# Patient Record
Sex: FEMALE | Race: WHITE
[De-identification: names, ages, dates, MRNs, and addresses within clinical notes are randomized per-mention and may not be internally consistent; named-entity substitution may affect disease eponyms.]

---

## 2017-07-03 ENCOUNTER — HOSPITAL ENCOUNTER (EMERGENCY)
Dept: HOSPITAL 17 - NEPD | Age: 22
Discharge: HOME | End: 2017-07-03
Payer: MEDICAID

## 2017-07-03 VITALS — BODY MASS INDEX: 21.67 KG/M2 | HEIGHT: 65 IN | WEIGHT: 130.07 LBS

## 2017-07-03 VITALS — SYSTOLIC BLOOD PRESSURE: 96 MMHG | HEART RATE: 75 BPM | DIASTOLIC BLOOD PRESSURE: 64 MMHG

## 2017-07-03 VITALS
SYSTOLIC BLOOD PRESSURE: 113 MMHG | RESPIRATION RATE: 15 BRPM | DIASTOLIC BLOOD PRESSURE: 59 MMHG | OXYGEN SATURATION: 100 % | HEART RATE: 87 BPM | TEMPERATURE: 98.1 F

## 2017-07-03 DIAGNOSIS — Z3A.00: ICD-10-CM

## 2017-07-03 DIAGNOSIS — O21.9: Primary | ICD-10-CM

## 2017-07-03 DIAGNOSIS — Z86.59: ICD-10-CM

## 2017-07-03 DIAGNOSIS — R10.30: ICD-10-CM

## 2017-07-03 LAB
BACTERIA #/AREA URNS HPF: (no result) /HPF
COLOR UR: YELLOW
COMMENT (UR): (no result)
CULTURE IF INDICATED: (no result)
GLUCOSE UR STRIP-MCNC: (no result) MG/DL
HGB UR QL STRIP: (no result)
KETONES UR STRIP-MCNC: (no result) MG/DL
MUCOUS THREADS #/AREA URNS LPF: (no result) /LPF
NITRITE UR QL STRIP: (no result)
SP GR UR STRIP: 1.01 (ref 1–1.03)
SQUAMOUS #/AREA URNS HPF: 26 /HPF (ref 0–5)

## 2017-07-03 PROCEDURE — 96374 THER/PROPH/DIAG INJ IV PUSH: CPT

## 2017-07-03 PROCEDURE — 84703 CHORIONIC GONADOTROPIN ASSAY: CPT

## 2017-07-03 PROCEDURE — 81001 URINALYSIS AUTO W/SCOPE: CPT

## 2017-07-03 PROCEDURE — 99284 EMERGENCY DEPT VISIT MOD MDM: CPT

## 2017-07-03 NOTE — PD
HPI


.


Nausea


Chief Complaint:  GI Complaint


Time Seen by Provider:  17:25


Travel History


International Travel<30 days:  No


Contact w/Intl Traveler<30days:  No


Traveled to known affect area:  No





History of Present Illness


HPI


22-year-old female presents with nausea and vomiting for 1-1/2 weeks.  Symptoms 

are getting progressively worse.  Patient took a pregnancy test on Friday which 

she reports was positive.  She had 4 episodes of emesis yesterday.  She has 

tried eating saltine crackers with no relief. No other known aggravating or 

alleviating factors.  She is having some associated lower abdominal pain that 

is bilateral, no radiation.  Describes the pain as sharp and constant.  She 

denies any vaginal bleeding.  Reports some clear discharge.





PFSH


Past Medical History


ADHD:  No


Bipolar Disorder:  Yes


Cancer:  No


Cardiovascular Problems:  No


Diabetes:  No (gestational)


Diminished Hearing:  No


Psychiatric:  Yes (HBS 2 YEARS AGO. CHANCE W DYSTHYMIC DIS.)


Immunizations Current:  Yes


Seizures:  No


Thyroid Disease:  No


Ulcer:  No


Tetanus Vaccination:  < 5 Years


Pregnant?:  Pregnant


LMP:  2017


:  3


Para:  1


:  1





Past Surgical History


Other Surgery:  No





Social History


Alcohol Use:  No


Tobacco Use:  No


Substance Use:  Yes (marijuana)





Allergies-Medications


(Allergen,Severity, Reaction):  


Coded Allergies:  


     Calamine (Verified  Allergy, Severe, SWELLING, 7/3/17)


Reported Meds & Prescriptions





Reported Meds & Active Scripts


Active


Zofran (Ondansetron HCl) 4 Mg Tab 4 Mg PO Q6HR PRN








Review of Systems


Except as stated in HPI:  all other systems reviewed are Neg


General / Constitutional:  Positive: Other (shaking)


Gastrointestinal:  Positive: Nausea, Vomiting, Diarrhea, Abdominal Pain


Genitourinary:  Positive: Pelvic Pain,  No: Urgency, Frequency, Dysuria, 

Vaginal Bleeding





Physical Exam


Narrative


GENERAL: Awake and alert and in no acute distress.


SKIN: Warm and dry.


HEAD: Atraumatic. Normocephalic. 


EYES: Pupils equal and round. 


NECK: Trachea midline.  


CARDIOVASCULAR: Regular rate and rhythm.  


RESPIRATORY: No accessory muscle use. 


GI/:  Abdomen is soft and nontender.  Nondistended.  No masses palpated.  

Uterus is not palpable above the symphysis pubis.


MUSCULOSKELETAL: No obvious deformities.   No edema. 


NEUROLOGICAL: Awake and alert. No obvious cranial nerve deficits.  Motor 

grossly within normal limits. Normal speech.


PSYCHIATRIC: Appropriate mood and affect; insight and judgment normal.





Data


Data


Last Documented VS





Vital Signs








  Date Time  Temp Pulse Resp B/P Pulse Ox O2 Delivery O2 Flow Rate FiO2


 


7/3/17 18:06  75  96/64    


 


7/3/17 17:04 98.1  15  100   








Orders





 Ed Poc Ultrasound (7/3/17 17:32)


Urinalysis - C+S If Indicated (7/3/17 17:41)


Sodium Chloride 0.9% Flush (Ns Flush) (7/3/17 17:45)


Ondansetron Inj (Zofran Inj) (7/3/17 17:45)


Ed Urine Pregnancytest Poc (7/3/17 17:41)


Sodium Chlor 0.9% 1000 Ml Inj (Ns 1000 M (7/3/17 17:45)





Labs








 Laboratory Tests








Test 7/3/17





 18:05


 


Urine Color YELLOW 


 


Urine Turbidity HAZY 


 


Urine pH 6.5 


 


Urine Specific Gravity 1.015 


 


Urine Protein 30 mg/dL


 


Urine Glucose (UA) NEG mg/dL


 


Urine Ketones TRACE mg/dL


 


Urine Occult Blood NEG 


 


Urine Nitrite NEG 


 


Urine Bilirubin NEG 


 


Urine Urobilinogen LESS THAN 2.0





 MG/DL


 


Urine Leukocyte Esterase NEG 


 


Urine RBC 1 /hpf


 


Urine WBC 2 /hpf


 


Urine Squamous Epithelial 26 /hpf





Cells 


 


Urine Bacteria RARE /hpf


 


Urine Mucus MOD /lpf


 


Microscopic Urinalysis Comment CULT NOT





 INDICATED














MDM


Medical Decision Making


Medical Screen Exam Complete:  Yes


Emergency Medical Condition:  Yes


Differential Diagnosis


Differential diagnosis includes but is not limited to viral gastritis, food 

poisoning, pancreatitis, pneumonia, hepatitis, acute coronary syndrome, 

pregnancy


Narrative Course


Patient presents with the chief complaint of nausea and vomiting in the setting 

of pregnancy.  She does have lower abdominal discomfort but a benign abdominal 

exam.  She does not appear dehydrated.  She'll be treated with IV fluids and IV 

Zofran.





Nausea and vomiting are improved.  Patient will be discharged home.





Procedures


**Procedure Narrative**


Emergency Department Pelvic ultrasound was performed with patient consent.


The curvilinear probe was used in the transverse and sagittal views within the 

suprapubic region revealing an intrauterine yolk sac.





Diagnosis





 Primary Impression:  


 Vomiting affecting pregnancy


Referrals:  


Obstetrician


Patient Instructions:  Acute Nausea and Vomiting (DC), General Instructions


***Med/Other Pt SpecificInfo:  Prescription(s) given


Scripts


Ondansetron (Zofran)4 Mg Tab4 Mg PO Q6HR PRN (NAUSEA OR VOMITING) #30 TAB  Ref 0


   Prov:Brandi Doll MD         7/3/17


Disposition:  01 DISCHARGE HOME


Condition:  Stable








Brandi Doll MD Jul 3, 2017 18:10

## 2017-10-26 ENCOUNTER — HOSPITAL ENCOUNTER (EMERGENCY)
Dept: HOSPITAL 17 - HOBED | Age: 22
Discharge: HOME | End: 2017-10-26
Payer: MEDICAID

## 2017-10-26 DIAGNOSIS — O26.892: Primary | ICD-10-CM

## 2017-10-26 DIAGNOSIS — Z3A.23: ICD-10-CM

## 2017-10-26 DIAGNOSIS — R10.9: ICD-10-CM

## 2017-10-26 DIAGNOSIS — R07.81: ICD-10-CM

## 2017-10-26 DIAGNOSIS — G58.8: ICD-10-CM

## 2017-10-26 PROCEDURE — 99283 EMERGENCY DEPT VISIT LOW MDM: CPT

## 2017-10-26 NOTE — PD
HPI


Chief Complaint


rib pain, lower abdominal/back pain


Date Seen:  Oct 26, 2017


Time Seen:  16:14


Travel History


International Travel<30 Days:  No


Contact w/Intl Traveler<30Days:  No


Known Affected Area:  No





History of Present Illness


HPI


Pt is a  @ 23.1wks with PNC with Dr. Davies.  She presents for 

evaluation of rib and abdominal/back pain which started last evening and 

continued today.  Pt states it feels like she can't take in a deep breath due 

to discomfort in her lower ribs.  No cough, SOB, or CP/pressure.  Her son 

recently had RSV at home but she has not been ill.  Her abdominal pain is vague 

and diffuse and runs into her lower L sciatica.  She denies dysuria/hematuria.  

She was tx'd for chlamydia and BV on .  No MILKA yet.  +FM.  No LOF, VB, or 

ctx.  She has not taking any tylenol as she does not want to take any 

medications in pregnancy.


Weeks Gestation:  23


Para:  1


:  3





History


Past Medical History


Medical History:  Denies Significant Hx





Obstetric History


Obstetric History


 x1


TAB x1 (D&C)





Past Surgical History


*** Narrative Surgical


D&C





Family History


Family History:  Negative





Social History


Alcohol Use:  No


Tobacco Use:  No


Substance Abuse:  Yes (occasional MJ use)





Allergies-Medications


(Allergen,Severity, Reaction):  


Coded Allergies:  


     calamine (Unverified  Allergy, Severe, SWELLING, 8/15/17)


Home Meds


Active Scripts


Ondansetron (Zofran) 4 Mg Tab, 4 MG PO Q6HR Y for NAUSEA OR VOMITING, #30 TAB 0 

Refills


   Prov:Brandi Doll MD         7/3/17





Review of Systems


Except as stated in HPI:  all other systems reviewed are Neg





Physical Exam


Narrative


GENERAL: Well-nourished, well-developed patient.


SKIN: Warm and dry.


HEAD: Normocephalic and atraumatic.


EYES: No scleral icterus. No injection or drainage. 


CARDIOVASCULAR: Regular rate and rhythm without murmurs, gallops, or rubs. 


RESPIRATORY: Breath sounds equal bilaterally. No accessory muscle use.


ABDOMEN/GI: Abdomen soft, non-tender, fundus just above umbilicus and non-tender

, +suprapubic TTP, no rebound or guarding


EXTREMITIES: No cyanosis or edema.


BACK: Nontender without obvious deformity. No CVA tenderness.


NEUROLOGICAL: Awake and alert. Motor and sensory grossly within normal limits. 





FHTs:  present @ 168


TOCO:  no ctx seen





Data


Data


Vital Signs Reviewed:  Yes





MDM


Plan


21y/o  @ 23.1wks with





1.  IUP


-- +FHTs





2.  abdominal pain


-- suprapubic in location


-- recent dx of CT/BV with tx but no MILKA


-- no rebound/guarding


-- UA demonstrates trace blood, no LE/nitrites


-- likely RLP





3.  back pain


-- no CVA tenderness


-- consistent with sciatica





4.  rib pain


-- lungs clear and O2 sats 100%


-- likely musculoskeletal


-- declines tylenol or flexeril





Dispo:  d/c home in stable condition


Diagnosis


Diagnosis:  


 Primary Impression:  


 Pregnancy


 Additional Impressions:  


 23 weeks gestation of pregnancy


 Abdominal pain affecting pregnancy


 Rib pain


 Sciatic neuralgia


Disposition:  01 DISCHARGE HOME











Hien Moss MD Oct 26, 2017 16:27

## 2017-10-26 NOTE — PD
HPI


Chief Complaint


rib pain, lower abdominal/back pain


Date Seen:  Oct 26, 2017


Time Seen:  16:14


Travel History


International Travel<30 Days:  No


Contact w/Intl Traveler<30Days:  No


Known Affected Area:  No





History of Present Illness


HPI


Pt is a  @ 23.1wks with PNC with Dr. Davies.  She presents for 

evaluation of rib and abdominal/back pain which started last evening and 

continued today.  Pt states it feels like she can't take in a deep breath due 

to discomfort in her lower ribs.  No cough, SOB, or CP/pressure.  Her son 

recently had RSV at home but she has not been ill.  Her abdominal pain is vague 

and diffuse and runs into her lower L sciatica.  She denies dysuria/hematuria.  

She was tx'd for chlamydia and BV on .  No MILKA yet.  +FM.  No LOF, VB, or 

ctx.  She has not taking any tylenol as she does not want to take any 

medications in pregnancy.


Weeks Gestation:  23


Para:  1


:  3





History


Past Medical History


Medical History:  Denies Significant Hx





Obstetric History


Obstetric History


 x1


TAB x1 (D&C)





Past Surgical History


*** Narrative Surgical


D&C





Family History


Family History:  Negative





Social History


Alcohol Use:  No


Tobacco Use:  No


Substance Abuse:  Yes (occasional MJ use)





Allergies-Medications


(Allergen,Severity, Reaction):  


Coded Allergies:  


     calamine (Unverified  Allergy, Severe, SWELLING, 8/15/17)


Home Meds


Active Scripts


Ondansetron (Zofran) 4 Mg Tab, 4 MG PO Q6HR Y for NAUSEA OR VOMITING, #30 TAB 0 

Refills


   Prov:Brandi Doll MD         7/3/17





Review of Systems


Except as stated in HPI:  all other systems reviewed are Neg





Physical Exam


Narrative


GENERAL: Well-nourished, well-developed patient.


SKIN: Warm and dry.


HEAD: Normocephalic and atraumatic.


EYES: No scleral icterus. No injection or drainage. 


CARDIOVASCULAR: Regular rate and rhythm without murmurs, gallops, or rubs. 


RESPIRATORY: Breath sounds equal bilaterally. No accessory muscle use.


ABDOMEN/GI: Abdomen soft, non-tender, fundus just above umbilicus and non-tender

, +suprapubic TTP, no rebound or guarding


EXTREMITIES: No cyanosis or edema.


BACK: Nontender without obvious deformity. No CVA tenderness.


NEUROLOGICAL: Awake and alert. Motor and sensory grossly within normal limits. 





FHTs:  present @ 168


TOCO:  no ctx seen





Data


Data


Vital Signs Reviewed:  Yes





MDM


Plan


23y/o  @ 23.1wks with





1.  IUP


-- +FHTs





2.  abdominal pain


-- suprapubic in location


-- recent dx of CT/BV with tx but no MILKA


-- no rebound/guarding


-- UA demonstrates trace blood, no LE/nitrites


-- likely RLP





3.  back pain


-- no CVA tenderness


-- consistent with sciatica





4.  rib pain


-- lungs clear and O2 sats 100%


-- likely musculoskeletal


-- declines tylenol or flexeril





Dispo:  d/c home in stable condition


Diagnosis


Diagnosis:  


 Primary Impression:  


 Pregnancy


 Additional Impressions:  


 23 weeks gestation of pregnancy


 Abdominal pain affecting pregnancy


 Rib pain


 Sciatic neuralgia


Disposition:  01 DISCHARGE HOME











Hien Moss MD Oct 26, 2017 16:27

## 2018-01-16 ENCOUNTER — HOSPITAL ENCOUNTER (OUTPATIENT)
Dept: HOSPITAL 17 - HPND | Age: 23
End: 2018-01-16
Attending: OBSTETRICS & GYNECOLOGY
Payer: MEDICAID

## 2018-01-16 DIAGNOSIS — O26.843: Primary | ICD-10-CM

## 2018-01-16 DIAGNOSIS — O36.5930: ICD-10-CM

## 2018-01-16 PROCEDURE — 76816 OB US FOLLOW-UP PER FETUS: CPT

## 2018-02-07 ENCOUNTER — HOSPITAL ENCOUNTER (EMERGENCY)
Dept: HOSPITAL 17 - HOBED | Age: 23
Discharge: HOME | End: 2018-02-07
Payer: MEDICAID

## 2018-02-07 DIAGNOSIS — Z3A.38: ICD-10-CM

## 2018-02-07 DIAGNOSIS — Z79.899: ICD-10-CM

## 2018-02-07 DIAGNOSIS — O47.1: Primary | ICD-10-CM

## 2018-02-07 PROCEDURE — 99284 EMERGENCY DEPT VISIT MOD MDM: CPT

## 2018-02-07 NOTE — PD
HPI


Chief Complaint


 Complains of a pressure possible contractions


Date Seen:  2018


Time Seen:  21:25


Travel History


International Travel<30 Days:  No


Contact w/Intl Traveler<30Days:  No


Known Affected Area:  No





History of Present Illness


HPI


42-year-old white female  A1 at 38 weeks sees Dr. Davies for prenatal 

care presents complaining of pelvic pain and pressure possible contractions.  

Denies bleeding or leakage of fluid.  Fetal heart rate tracing is reactive and 

there are no regular contractions only occasional one


Weeks Gestation:  38


Para:  1


:  3





History


Obstetric History


Obstetric History


One vaginal delivery and one early pregnancy loss





Social History


Alcohol Use:  No


Tobacco Use:  No


Substance Abuse:  No





Allergies-Medications


(Allergen,Severity, Reaction):  


Coded Allergies:  


     calamine (Unverified  Allergy, Severe, SWELLING, 8/15/17)


Home Meds


Active Scripts


Ondansetron (Zofran) 4 Mg Tab, 4 MG PO Q6HR Y for NAUSEA OR VOMITING, #30 TAB 0 

Refills


   Prov:Brandi Doll MD         7/3/17





Review of Systems


General / Constitutional:  No: Fever, Weight Gain, Chills, Other


Eyes:  No: Diploplia, Blurred Vision, Visual changes, Pain, Photophobia


HENT:  No: Headaches, Vertigo, Lightheadedness


Cardiovascular:  No: Irregular Rhythm, Chest Pain or Discomfort, Palpitations, 

Tachycardia, Syncope, Varicosities, Edema, Cyanosis


Respiratory:  No: Cough, Short of Breath, Other


Gastrointestinal:  Abdominal Pain, No: Nausea, Vomiting, Diarrhea


Genitourinary:  No: Decreased Urinary Output, Oliguria


Musculoskeletal:  No: Limited ROM, Weakness, Cramping, Edema, Pain


Skin:  No Rash, No Itching, No Dryness, No Lumps, No Change in Pigmentation, No 

Change in Nails, No Alopecia, No Lesions


Neurologic:  No: Weakness, Dizziness, Syncope, Focal Abnormalities, 

Coordination Problem, Headache, Slurred Speech, Seizures


Psychiatric:  No: Depression, Suicidal Ideations, Homicidal Ideation


Endocrine:  No: Heat Intolerance, Cold Intolerance, Polydipsia, Polyuria, Other





Physical Exam


Narrative


GENERAL: Well-nourished, well-developed patient.


SKIN: Warm and dry.


HEAD: Normocephalic and atraumatic.


EYES: No scleral icterus. No injection or drainage. 


ENT: No nasal drainage noted. Mucous membranes pink. Airway patent.


NECK: Supple, trachea midline. No JVD.


CARDIOVASCULAR: Regular rate and rhythm without murmurs, gallops, or rubs. 


RESPIRATORY: Breath sounds equal bilaterally. No accessory muscle use.


BREASTS: Bilateral exam showed no masses , no retractions, no nipple discharge.


ABDOMEN/GI: Abdomen soft, non-tender, bowel sounds present, no rebound, no 

guarding 


   Gravid to [38-] weeks size


   Fundal Height: [38-]


GENITOURINARY: 


   External Genitalia: intact and normal in appearance


   BUS glands: [-]


   Cervix: [post-]


   Dilatation: [1-2-]          


   Effacement: [-70]          


   Station: [-3]  


   Presentation: [-vtx]        


   Membranes: [intact  ]


   Uterine Contractions: [no reg ctx-]


FHT's: 


   Category: [1-]   


   Baseline: [-133]   


   Reactive: [-R]   


   Variability: [mod-]  


   Decels: [none-]  


EXTREMITIES: No cyanosis or edema.


BACK: Nontender without obvious deformity. No CVA tenderness.


NEUROLOGICAL: Awake and alert. Motor and sensory grossly within normal limits. 

Five out of 5 muscle strength in all muscle groups. Normal speech.





MDM


Interpretation(s)


Patient's 22-year-old white female  at 38 weeks presents quite a pelvic 

pressure and possible contractions.  She's placed on the monitor and has a 

reactive fetal heart rate tracing.  Only occasional contractions.  Cervix is 1-2

/70/-3/vertex.


Plan


Plan to discharge patient home to bedrest.  The patient drove herself with a 

toddler and she has no other her way to get home and so we can I give her 

narcotic shot however she can take Tylenol, bedrest, oral fluids hydration, 

heating pad or hot bath for symptom relief and see Dr. Davies


Diagnosis


Diagnosis:  


 Primary Impression:  


 [place]' contraction


Disposition:  01 DISCHARGE HOME


Condition:  Stable











Cecilio Gonzalez II, MD 2018 21:31

## 2018-02-16 ENCOUNTER — HOSPITAL ENCOUNTER (INPATIENT)
Dept: HOSPITAL 17 - HOBED | Age: 23
LOS: 1 days | Discharge: HOME | End: 2018-02-17
Attending: OBSTETRICS & GYNECOLOGY | Admitting: OBSTETRICS & GYNECOLOGY
Payer: MEDICAID

## 2018-02-16 VITALS — HEART RATE: 95 BPM | SYSTOLIC BLOOD PRESSURE: 112 MMHG | DIASTOLIC BLOOD PRESSURE: 64 MMHG

## 2018-02-16 VITALS — DIASTOLIC BLOOD PRESSURE: 60 MMHG | SYSTOLIC BLOOD PRESSURE: 117 MMHG | HEART RATE: 84 BPM

## 2018-02-16 VITALS — SYSTOLIC BLOOD PRESSURE: 105 MMHG | DIASTOLIC BLOOD PRESSURE: 63 MMHG | HEART RATE: 74 BPM

## 2018-02-16 VITALS — SYSTOLIC BLOOD PRESSURE: 99 MMHG | HEART RATE: 84 BPM | DIASTOLIC BLOOD PRESSURE: 58 MMHG

## 2018-02-16 VITALS — SYSTOLIC BLOOD PRESSURE: 85 MMHG | HEART RATE: 90 BPM | DIASTOLIC BLOOD PRESSURE: 56 MMHG

## 2018-02-16 VITALS — SYSTOLIC BLOOD PRESSURE: 101 MMHG | DIASTOLIC BLOOD PRESSURE: 66 MMHG | HEART RATE: 88 BPM

## 2018-02-16 VITALS — DIASTOLIC BLOOD PRESSURE: 60 MMHG | HEART RATE: 95 BPM | SYSTOLIC BLOOD PRESSURE: 115 MMHG

## 2018-02-16 VITALS
DIASTOLIC BLOOD PRESSURE: 55 MMHG | HEART RATE: 76 BPM | TEMPERATURE: 97.7 F | SYSTOLIC BLOOD PRESSURE: 106 MMHG | RESPIRATION RATE: 18 BRPM

## 2018-02-16 VITALS — DIASTOLIC BLOOD PRESSURE: 75 MMHG | SYSTOLIC BLOOD PRESSURE: 95 MMHG | HEART RATE: 94 BPM

## 2018-02-16 VITALS — SYSTOLIC BLOOD PRESSURE: 107 MMHG | DIASTOLIC BLOOD PRESSURE: 63 MMHG | HEART RATE: 91 BPM

## 2018-02-16 VITALS — DIASTOLIC BLOOD PRESSURE: 66 MMHG | HEART RATE: 87 BPM | SYSTOLIC BLOOD PRESSURE: 127 MMHG

## 2018-02-16 VITALS — TEMPERATURE: 97.7 F | RESPIRATION RATE: 20 BRPM

## 2018-02-16 VITALS
HEART RATE: 102 BPM | TEMPERATURE: 97.8 F | SYSTOLIC BLOOD PRESSURE: 114 MMHG | RESPIRATION RATE: 18 BRPM | DIASTOLIC BLOOD PRESSURE: 73 MMHG

## 2018-02-16 VITALS — HEART RATE: 80 BPM | SYSTOLIC BLOOD PRESSURE: 104 MMHG | DIASTOLIC BLOOD PRESSURE: 63 MMHG

## 2018-02-16 VITALS — TEMPERATURE: 97.6 F | RESPIRATION RATE: 18 BRPM

## 2018-02-16 VITALS — SYSTOLIC BLOOD PRESSURE: 112 MMHG | DIASTOLIC BLOOD PRESSURE: 68 MMHG | HEART RATE: 89 BPM

## 2018-02-16 VITALS — RESPIRATION RATE: 18 BRPM

## 2018-02-16 VITALS — SYSTOLIC BLOOD PRESSURE: 118 MMHG | DIASTOLIC BLOOD PRESSURE: 72 MMHG | HEART RATE: 108 BPM

## 2018-02-16 VITALS — OXYGEN SATURATION: 100 % | HEART RATE: 94 BPM

## 2018-02-16 VITALS — HEART RATE: 85 BPM | SYSTOLIC BLOOD PRESSURE: 102 MMHG | DIASTOLIC BLOOD PRESSURE: 68 MMHG

## 2018-02-16 VITALS — DIASTOLIC BLOOD PRESSURE: 69 MMHG | SYSTOLIC BLOOD PRESSURE: 102 MMHG | HEART RATE: 88 BPM

## 2018-02-16 VITALS — DIASTOLIC BLOOD PRESSURE: 55 MMHG | SYSTOLIC BLOOD PRESSURE: 99 MMHG | OXYGEN SATURATION: 100 % | HEART RATE: 95 BPM

## 2018-02-16 VITALS — SYSTOLIC BLOOD PRESSURE: 96 MMHG | HEART RATE: 89 BPM | DIASTOLIC BLOOD PRESSURE: 55 MMHG

## 2018-02-16 VITALS — HEART RATE: 93 BPM | RESPIRATION RATE: 18 BRPM | DIASTOLIC BLOOD PRESSURE: 63 MMHG | SYSTOLIC BLOOD PRESSURE: 107 MMHG

## 2018-02-16 VITALS — DIASTOLIC BLOOD PRESSURE: 52 MMHG | HEART RATE: 88 BPM | SYSTOLIC BLOOD PRESSURE: 95 MMHG

## 2018-02-16 VITALS — DIASTOLIC BLOOD PRESSURE: 74 MMHG | HEART RATE: 92 BPM | SYSTOLIC BLOOD PRESSURE: 113 MMHG

## 2018-02-16 VITALS — DIASTOLIC BLOOD PRESSURE: 57 MMHG | HEART RATE: 85 BPM | SYSTOLIC BLOOD PRESSURE: 99 MMHG

## 2018-02-16 VITALS — SYSTOLIC BLOOD PRESSURE: 107 MMHG | DIASTOLIC BLOOD PRESSURE: 67 MMHG | HEART RATE: 93 BPM

## 2018-02-16 VITALS — HEART RATE: 108 BPM | SYSTOLIC BLOOD PRESSURE: 129 MMHG | DIASTOLIC BLOOD PRESSURE: 61 MMHG

## 2018-02-16 VITALS — OXYGEN SATURATION: 100 % | DIASTOLIC BLOOD PRESSURE: 59 MMHG | SYSTOLIC BLOOD PRESSURE: 104 MMHG | HEART RATE: 82 BPM

## 2018-02-16 VITALS — WEIGHT: 155 LBS | HEIGHT: 63 IN | BODY MASS INDEX: 27.46 KG/M2

## 2018-02-16 VITALS — SYSTOLIC BLOOD PRESSURE: 106 MMHG | DIASTOLIC BLOOD PRESSURE: 74 MMHG | HEART RATE: 102 BPM

## 2018-02-16 VITALS — HEART RATE: 87 BPM | DIASTOLIC BLOOD PRESSURE: 61 MMHG | SYSTOLIC BLOOD PRESSURE: 111 MMHG

## 2018-02-16 VITALS — OXYGEN SATURATION: 100 % | HEART RATE: 88 BPM

## 2018-02-16 DIAGNOSIS — F12.10: ICD-10-CM

## 2018-02-16 DIAGNOSIS — Z3A.39: ICD-10-CM

## 2018-02-16 LAB
BACTERIA #/AREA URNS HPF: (no result) /HPF
BASOPHILS # BLD AUTO: 0.1 TH/MM3 (ref 0–0.2)
BASOPHILS NFR BLD: 0.6 % (ref 0–2)
COLOR UR: (no result)
EOSINOPHIL # BLD: 0.2 TH/MM3 (ref 0–0.4)
EOSINOPHIL NFR BLD: 1.3 % (ref 0–4)
ERYTHROCYTE [DISTWIDTH] IN BLOOD BY AUTOMATED COUNT: 19.7 % (ref 11.6–17.2)
GLUCOSE UR STRIP-MCNC: (no result) MG/DL
HCT VFR BLD CALC: 30.9 % (ref 35–46)
HGB BLD-MCNC: 9.9 GM/DL (ref 11.6–15.3)
HGB UR QL STRIP: (no result)
HYALINE CASTS #/AREA URNS LPF: 1 /LPF
KETONES UR STRIP-MCNC: (no result) MG/DL
LYMPHOCYTES # BLD AUTO: 3.6 TH/MM3 (ref 1–4.8)
LYMPHOCYTES NFR BLD AUTO: 29.1 % (ref 9–44)
MCH RBC QN AUTO: 25.7 PG (ref 27–34)
MCHC RBC AUTO-ENTMCNC: 32.2 % (ref 32–36)
MCV RBC AUTO: 79.7 FL (ref 80–100)
MONOCYTE #: 1 TH/MM3 (ref 0–0.9)
MONOCYTES NFR BLD: 8.5 % (ref 0–8)
MUCOUS THREADS #/AREA URNS LPF: (no result) /LPF
NEUTROPHILS # BLD AUTO: 7.4 TH/MM3 (ref 1.8–7.7)
NEUTROPHILS NFR BLD AUTO: 60.5 % (ref 16–70)
NITRITE UR QL STRIP: (no result)
PLATELET # BLD: 447 TH/MM3 (ref 150–450)
PMV BLD AUTO: 7.1 FL (ref 7–11)
RBC # BLD AUTO: 3.87 MIL/MM3 (ref 4–5.3)
SP GR UR STRIP: 1 (ref 1–1.03)
SQUAMOUS #/AREA URNS HPF: 22 /HPF (ref 0–5)
URINE LEUKOCYTE ESTERASE: (no result)
WBC # BLD AUTO: 12.2 TH/MM3 (ref 4–11)

## 2018-02-16 PROCEDURE — 0HQ9XZZ REPAIR PERINEUM SKIN, EXTERNAL APPROACH: ICD-10-PCS | Performed by: OBSTETRICS & GYNECOLOGY

## 2018-02-16 PROCEDURE — 85025 COMPLETE CBC W/AUTO DIFF WBC: CPT

## 2018-02-16 PROCEDURE — 86900 BLOOD TYPING SEROLOGIC ABO: CPT

## 2018-02-16 PROCEDURE — 76818 FETAL BIOPHYS PROFILE W/NST: CPT

## 2018-02-16 PROCEDURE — 80307 DRUG TEST PRSMV CHEM ANLYZR: CPT

## 2018-02-16 PROCEDURE — 10907ZC DRAINAGE OF AMNIOTIC FLUID, THERAPEUTIC FROM PRODUCTS OF CONCEPTION, VIA NATURAL OR ARTIFICIAL OPENING: ICD-10-PCS | Performed by: OBSTETRICS & GYNECOLOGY

## 2018-02-16 PROCEDURE — 81001 URINALYSIS AUTO W/SCOPE: CPT

## 2018-02-16 PROCEDURE — G0481 DRUG TEST DEF 8-14 CLASSES: HCPCS

## 2018-02-16 PROCEDURE — 86901 BLOOD TYPING SEROLOGIC RH(D): CPT

## 2018-02-16 RX ADMIN — OXYTOCIN SCH MLS/HR: 10 INJECTION, SOLUTION INTRAMUSCULAR; INTRAVENOUS at 03:35

## 2018-02-16 RX ADMIN — OXYTOCIN SCH MLS/HR: 10 INJECTION, SOLUTION INTRAMUSCULAR; INTRAVENOUS at 05:36

## 2018-02-16 RX ADMIN — STANDARDIZED SENNA CONCENTRATE AND DOCUSATE SODIUM PRN TAB: 8.6; 5 TABLET, FILM COATED ORAL at 21:50

## 2018-02-16 RX ADMIN — IBUPROFEN PRN MG: 800 TABLET, FILM COATED ORAL at 21:51

## 2018-02-16 RX ADMIN — IBUPROFEN PRN MG: 800 TABLET, FILM COATED ORAL at 13:18

## 2018-02-16 NOTE — PD.OB.DELI
Weeks gestation:  39


Gest age assessed date:  2018


Pt started active labor?:  Yes


Active labor start date:  2018


Medical induction of labor?:  No


Artificial rupture of membrane:  Yes


Artificial ROM date:  2018


Anesthesia:  Epidural


Episiotomy:  None


Vaginal Delivery:  Normal


Presentation:  Occiput anterior


Nuchal Cord:  None


Delayed cord clamping (45 sec):  Yes


Infant:  Female


Delivery date:  2018


Delivery time:  10:36


One Minute APGAR:  8


Five Minute APGAR:  9


Birth Weight:  6/13


Placenta:  Spontaneous delivery, Intact, 3 vessel cord


Laceration:  1 deg


Repair:  Vicryl running


Estimated blood loss:  300cc


Additional Information


Nice delivery of Sima.


Intact perineum


Small superficial left labial laceration repaired with 5-0 vicryl


Mom and baby did well


Sima looks like her older brother  Pj..











JOSEPH Davies MD 2018 11:11

## 2018-02-16 NOTE — PD
HPI


Chief Complaint


ctx


Date Seen:  2018


Time Seen:  03:24


Travel History


International Travel<30 Days:  No


Contact w/Intl Traveler<30Days:  No


Known Affected Area:  No





History of Present Illness


HPI


21y/o  @ 39.2wks.  She has PNC with Dr. Davies.  She presents for ctx 

which started at 2am and are becoming increasingly more painful.  No LOF or VB.

  +FM.  She was 2cm in clinic on Tuesday.


Weeks Gestation:  39


Para:  1


:  3





History


Past Medical History


Medical History:  Denies Significant Hx





Obstetric History


Obstetric History


TAB x1


 x1





Past Surgical History


Surgical History:  No Previous Surgery





Family History


Family History:  Negative





Social History


Alcohol Use:  No


Tobacco Use:  No


Substance Abuse:  Yes (MJ use entire pregnancy)





Allergies-Medications


(Allergen,Severity, Reaction):  


Coded Allergies:  


     calamine (Unverified  Allergy, Severe, SWELLING, 18)


Home Meds


Active Scripts


Ondansetron (Zofran) 4 Mg Tab, 4 MG PO Q6HR Y for NAUSEA OR VOMITING, #30 TAB 0 

Refills


   Prov:Brandi Doll MD         7/3/17





Review of Systems


Except as stated in HPI:  all other systems reviewed are Neg





Physical Exam


Narrative


General:  well developed, well nourished, no acute distress


HEENT:  normocephalic atraumatic, extraocular movements intact, neck supple 


Abdomen:  soft, gravid, nontender, nondistended


Uterus:  fundus term


Extremities:  full range of motion 


Skin:  normal coloration, no rashes, no suspicious skin lesions noted


Neurologic:  cranial nerves 2-12 grossly intact, normal muscle tone, normal gait


Psychiatric:  normal mood and affect, appropriate





FHTs:  130s, +accels, no decels, moderate variability, reactive


[place]:  ctx q2-3m


Cvx:  3/80/-1








Data


Data


Vital Signs Reviewed:  Yes


Orders





 Orders


Vital Signs (Adult) .ON ADMISSION (18 03:23)


^ Labor Status (18 03:23)


^ Non Stress Test (18 03:23)


Admit To Inpatient (18 )


Vital Signs (Adult) .Per protocol (18 03:23)


Fetal Heart (18 03:23)


Amnioinfusion (18 03:23)


Urinary Catheter Management .ONCE (18 03:23)


Diet Liquid (18 Breakfast)


Lactated Ringer's 1000 Ml Inj (Lr 1000 M (18 03:23)


Lactated Ringer's 1000 Ml Inj (Lr 1000 M (18 03:23)


Sodium Chlorid 0.9% 500 Ml Inj (Ns 500 M (18 03:30)


Sodium Chlor 0.9% 1000 Ml Inj (Ns 1000 M (18 03:43)


Lidocaine 1% Inj (50 Ml) (Xylocaine 1% I (18 03:30)


Citric Acid-Sodium Citrate Liq (Bicitra (18 03:30)


Ondansetron Inj (Zofran Inj) (18 03:30)


Fentanyl Inj (Fentanyl Inj) (18 03:30)


Fentanyl Inj (Fentanyl Inj) (18 03:30)


Complete Blood Count With Diff (18 03:23)


Hold Clot (18 03:23)


Abo/Rh Blood Type (18 03:23)


Urinalysis - C+S If Indicated (18 03:23)


Drug Screen, Random Urine (18 03:23)


Resp Oxygen Non Rebreathe Mask (18 )


^ Epidural / Intrathecal Infus (18 03:23)


Oxytocin 30 Units-500ml Premix (Pitocin (18 03:30)


Lidocaine 1% Inj (50 Ml) (Xylocaine 1% I (18 03:30)


Light Mineral Oil (Muri-Lube Oil) (18 03:30)


Inpatient Certification (18 )


Group B Strep:  Negative





MDM


Plan


21y/o  @ 39.2wks in early labor.





-- admit to L&D


-- CLD, epidural/davison PRN


-- FHTs cat 1


-- GBS neg





Dispo:  Dr. Davies (on call) notified of pt status and plan of care.  He will 

assume care of the pt.  Courtesy orders placed.


Diagnosis


Diagnosis:  


 Primary Impression:  


 39 weeks gestation of pregnancy


 Additional Impressions:  


 Uterine contractions during pregnancy


 Cannabis abuse











Hien Moss MD 2018 03:29

## 2018-02-17 VITALS
SYSTOLIC BLOOD PRESSURE: 101 MMHG | RESPIRATION RATE: 15 BRPM | TEMPERATURE: 97.6 F | HEART RATE: 78 BPM | DIASTOLIC BLOOD PRESSURE: 58 MMHG | OXYGEN SATURATION: 97 %

## 2018-02-17 RX ADMIN — STANDARDIZED SENNA CONCENTRATE AND DOCUSATE SODIUM PRN TAB: 8.6; 5 TABLET, FILM COATED ORAL at 12:27

## 2018-02-17 RX ADMIN — IBUPROFEN PRN MG: 800 TABLET, FILM COATED ORAL at 08:21

## 2018-02-17 NOTE — HHI.DCPOC
Discharge Care Plan


Diagnosis:  


(1) Vaginal delivery


Report Symptoms to Your Doctor


-Temperature above 100.5 degrees


-Redness, of incision or excessive or foul smelling drainage


-Unusual pain or calf pain


-Increased vaginal bleeding


-Painful or difficulty urinating


-Feelings of extreme sadness or anxiety after 2 weeks


Goals to Promote Your Health


* To prevent worsening of your condition and complications


* To maintain your health at the optimal level


Directions to Meet Your Goals


*** Take your medications as prescribed


*** Follow your dietary instruction


*** Follow activity as directed


*** Ensure plenty of rest for recovery


*** Drink fluids for hydration








*** Keep your appointments as scheduled


*** Take your immunizations and boosters as scheduled


*** If your symptoms worsen call your PCP, if no PCP go to Urgent Care Center 

or Emergency Room***


*** Smoking is Dangerous to Your Health. Avoid second hand smoke***


***Call the 24-hour crisis hotline for domestic abuse at 1-183.968.8158***











JOSEPH Davies MD Feb 17, 2018 16:46

## 2018-06-07 ENCOUNTER — HOSPITAL ENCOUNTER (EMERGENCY)
Dept: HOSPITAL 17 - NED | Age: 23
Discharge: LEFT BEFORE DISCHARGE | End: 2018-06-07
Payer: MEDICAID

## 2018-06-07 DIAGNOSIS — Z53.21: ICD-10-CM

## 2018-06-07 DIAGNOSIS — R51: Primary | ICD-10-CM

## 2018-06-07 PROCEDURE — 99281 EMR DPT VST MAYX REQ PHY/QHP: CPT
